# Patient Record
Sex: FEMALE | Race: WHITE
[De-identification: names, ages, dates, MRNs, and addresses within clinical notes are randomized per-mention and may not be internally consistent; named-entity substitution may affect disease eponyms.]

---

## 2018-08-05 ENCOUNTER — HOSPITAL ENCOUNTER (EMERGENCY)
Dept: HOSPITAL 96 - M.ERS | Age: 83
Discharge: HOME | End: 2018-08-05
Payer: MEDICARE

## 2018-08-05 VITALS — BODY MASS INDEX: 28.32 KG/M2 | WEIGHT: 150 LBS | HEIGHT: 61 IN

## 2018-08-05 VITALS — SYSTOLIC BLOOD PRESSURE: 127 MMHG | DIASTOLIC BLOOD PRESSURE: 65 MMHG

## 2018-08-05 DIAGNOSIS — Z88.5: ICD-10-CM

## 2018-08-05 DIAGNOSIS — S92.355A: Primary | ICD-10-CM
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
## 2018-08-05 DIAGNOSIS — Z88.0: ICD-10-CM

## 2018-08-05 DIAGNOSIS — M19.90: ICD-10-CM

## 2018-08-05 DIAGNOSIS — M79.7: ICD-10-CM
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
## 2018-08-05 DIAGNOSIS — E78.00: ICD-10-CM
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
## 2019-04-08 ENCOUNTER — HOSPITAL ENCOUNTER (OUTPATIENT)
Dept: HOSPITAL 96 - M.CRD | Age: 84
End: 2019-04-08
Attending: INTERNAL MEDICINE
Payer: MEDICARE

## 2019-04-08 DIAGNOSIS — I08.8: Primary | ICD-10-CM

## 2019-04-08 DIAGNOSIS — I50.9: ICD-10-CM

## 2019-04-08 NOTE — 2DMMODE
[place] [address]
Phone:  [phone] 2 D/M-MODE ECHOCARDIOGRAM     
_______________________________________________________________________________
 
Name:         ALLISON MIRAMONTES             Room:                     REG CLI
M.R.#:    [id number]     Account #:     [id number]  
Admission:    19    Attend Phys:   Hitesh Parada,
Discharge:                Date of Birth: [birth year]  
Date of Service: 19 1715  Report #:      [id number]
        [id number]
_______________________________________________________________________________
THIS REPORT FOR:  //name//                      
 
 
--------------- APPROVED REPORT --------------
 
 
Study performed:  2019 13:01:18
 
EXAM: Comprehensive 2D, Doppler, and color-flow 
Echocardiogram 
Patient Location: Out-Patient   
 
      BSA:         1.70
HR: 72 bpm BP:          147/62 mmHg 
 
Other Information 
Study Quality: Good
 
Indications
Congestive Heart Failure
Mitral Valve Disease
 
2D Dimensions
IVSd:  10.59 (7-11mm) LVOT Diam:  20.33 (18-24mm) 
LVDd:  48.08 mm  
PWd:  10.57 (7-11mm) Ascending Ao:  31.33 (22-36mm)
LVDs:  27.34 (25-40mm) 
Aortic Root:  23.97 mm 
 
Volumes
Left Atrial Volume (Systole) 
    LA ESV Index:  36.70 mL/m2
 
Aortic Valve
AoV Peak Matt.:  1.64 m/s 
AO Peak Gr.:  10.73 mmHg LVOT Max P.31 mmHg
AO Mean Gr.:  6.68 mmHg  LVOT Mean P.63 mmHg
    LVOT Max V:  1.53 m/s
AO V2 VTI:  35.09 cm  LVOT Mean V:  0.98 m/s
PAUL (VTI):  3.39 cm2  LVOT V1 VTI:  36.66 cm
 
Mitral Valve
MV Peak Gr.:  6.97 mmHg  
MV Mean Gr.:  3.22 mmHg  E/A Ratio:  0.93
    MV Decel. Time:  408.17 ms
MV E Max Matt.:  1.05 m/s 
 
 
[place] [address]
Phone:  [phone]                     2 D/M-MODE ECHOCARDIOGRAM     
_______________________________________________________________________________
 
Name:         ALLISON MIRAMONTES             Room:                     REG CLI
M.R.#:    [id number]     Account #:     [id number]  
Admission:    19    Attend Phys:   Hitesh Parada,
Discharge:                Date of Birth: [birth year]  
Date of Service: 19 1715  Report #:      [id number]
        [id number]
_______________________________________________________________________________
MV PHT:  118.37 ms  
MVA (PHT):  1.86 cm2  
 
TDI
E/Lateral E':  26.25 E/Medial E':  26.25
   Medial E' Matt.:  0.04 m/s
   Lateral E' Matt.:  0.04 m/s
 
Pulmonary Valve
PV Peak Matt.:  0.82 m/s PV Peak Gr.:  2.70 mmHg
 
Tricuspid Valve
    RAP Estimate:  5.00 mmHg
TR Peak Gr.:  19.90 mmHg RVSP:  24.90 mmHg
    PA Pressure:  24.90 mmHg
 
Left Ventricle
The left ventricle is normal size. There is normal LV segmental wall 
motion. There is normal left ventricular wall thickness. Left 
ventricular systolic function is normal. The left ventricular 
ejection fraction is within the normal range. LVEF is 60-65%. Grade I 
- abnormal relaxation pattern.
 
Right Ventricle
The right ventricle is normal size. The right ventricular systolic 
function is normal.
 
Atria
Left atrium is mildly dilated. The right atrium size is 
normal.
 
Aortic Valve
The aortic valve is normal in structure. Mild aortic regurgitation. 
There is no aortic valvular stenosis.
 
Mitral Valve
Moderate mitral annular calcification. Mild mitral regurgitation. No 
evidence of mitral valve stenosis.
 
Tricuspid Valve
The tricuspid valve is normal in structure. Mild tricuspid 
regurgitation.
 
Pulmonic Valve
The pulmonary valve is normal in structure. Mild pulmonic 
regurgitation.
 
 
[place] [address]
Phone:  [phone]                     2 D/M-MODE ECHOCARDIOGRAM     
_______________________________________________________________________________
 
Name:         ALLISON MIRAMONTES             Room:                     REG CLI
M.R.#:    [id number]     Account #:     [id number]  
Admission:    19    Attend Phys:   Hitesh Parada,
Discharge:                Date of Birth: [birth year]  
Date of Service: 19 1715  Report #:      [id number]
        [id number]
_______________________________________________________________________________
 
Great Vessels
The aortic root is normal in size. IVC is normal in size and 
collapses >50% with inspiration.
 
Pericardium
There is no pericardial effusion.
 
<Conclusion>
LVEF is 60-65%.
Left atrium is mildly dilated.
Mild aortic regurgitation.
Mild mitral regurgitation.
 
 
 
 
 
 
 
 
 
 
 
 
 
 
 
 
 
 
 
 
 
 
 
 
 
 
 
 
 
 
 
  <ELECTRONICALLY SIGNED>
                                           By: Mateo Faria MD, [facility]      
  19
D: 19   _____________________________________
T: 19   Mateo Faria MD, FAC        /INF